# Patient Record
Sex: FEMALE | Race: WHITE | Employment: UNEMPLOYED | URBAN - METROPOLITAN AREA
[De-identification: names, ages, dates, MRNs, and addresses within clinical notes are randomized per-mention and may not be internally consistent; named-entity substitution may affect disease eponyms.]

---

## 2017-12-15 ENCOUNTER — TELEPHONE (OUTPATIENT)
Dept: BARIATRICS/WEIGHT MGMT | Age: 37
End: 2017-12-15

## 2019-12-19 ENCOUNTER — TELEPHONE (OUTPATIENT)
Dept: BARIATRICS/WEIGHT MGMT | Age: 39
End: 2019-12-19

## 2020-06-04 ENCOUNTER — TELEPHONE (OUTPATIENT)
Dept: BARIATRICS/WEIGHT MGMT | Age: 40
End: 2020-06-04

## 2020-06-12 ENCOUNTER — HOSPITAL ENCOUNTER (OUTPATIENT)
Age: 40
Discharge: HOME | End: 2020-06-12
Payer: COMMERCIAL

## 2020-06-12 LAB
ALBUMIN SERPL-MCNC: 4.2 G/DL (ref 3.5–5.2)
ALBUMIN/GLOBULIN RATIO: 1.5 (ref 1–2.5)
ALP BLD-CCNC: 62 U/L (ref 35–104)
ALT SERPL-CCNC: 11 U/L (ref 5–33)
ANION GAP SERPL CALCULATED.3IONS-SCNC: 15 MMOL/L (ref 9–17)
AST SERPL-CCNC: 14 U/L
BILIRUB SERPL-MCNC: 0.62 MG/DL (ref 0.3–1.2)
BUN BLDV-MCNC: 12 MG/DL (ref 6–20)
BUN/CREAT BLD: NORMAL (ref 9–20)
CALCIUM IONIZED: 1.31 MMOL/L (ref 1.13–1.33)
CALCIUM SERPL-MCNC: 9 MG/DL (ref 8.6–10.4)
CHLORIDE BLD-SCNC: 104 MMOL/L (ref 98–107)
CHOLESTEROL, FASTING: 185 MG/DL
CHOLESTEROL/HDL RATIO: 3.1
CO2: 22 MMOL/L (ref 20–31)
CREAT SERPL-MCNC: 0.65 MG/DL (ref 0.5–0.9)
FERRITIN: 8 UG/L (ref 13–150)
GFR AFRICAN AMERICAN: >60 ML/MIN
GFR NON-AFRICAN AMERICAN: >60 ML/MIN
GFR SERPL CREATININE-BSD FRML MDRD: NORMAL ML/MIN/{1.73_M2}
GFR SERPL CREATININE-BSD FRML MDRD: NORMAL ML/MIN/{1.73_M2}
GLUCOSE BLD-MCNC: 83 MG/DL (ref 70–99)
HCT VFR BLD CALC: 40.9 % (ref 36.3–47.1)
HDLC SERPL-MCNC: 60 MG/DL
HEMOGLOBIN: 12.7 G/DL (ref 11.9–15.1)
IRON SATURATION: 10 % (ref 20–55)
IRON: 40 UG/DL (ref 37–145)
LDL CHOLESTEROL: 108 MG/DL (ref 0–130)
MAGNESIUM: 1.8 MG/DL (ref 1.6–2.6)
MCH RBC QN AUTO: 24.1 PG (ref 25.2–33.5)
MCHC RBC AUTO-ENTMCNC: 31.1 G/DL (ref 28.4–34.8)
MCV RBC AUTO: 77.8 FL (ref 82.6–102.9)
NRBC AUTOMATED: 0 PER 100 WBC
PDW BLD-RTO: 14.2 % (ref 11.8–14.4)
PLATELET # BLD: 278 K/UL (ref 138–453)
PMV BLD AUTO: 12.8 FL (ref 8.1–13.5)
POTASSIUM SERPL-SCNC: 4.1 MMOL/L (ref 3.7–5.3)
PTH INTACT: 44.37 PG/ML (ref 15–65)
RBC # BLD: 5.26 M/UL (ref 3.95–5.11)
SODIUM BLD-SCNC: 141 MMOL/L (ref 135–144)
TOTAL IRON BINDING CAPACITY: 420 UG/DL (ref 250–450)
TOTAL PROTEIN: 7 G/DL (ref 6.4–8.3)
TRIGLYCERIDE, FASTING: 86 MG/DL
TSH SERPL DL<=0.05 MIU/L-ACNC: 1.5 MIU/L (ref 0.3–5)
UNSATURATED IRON BINDING CAPACITY: 380 UG/DL (ref 112–347)
VITAMIN B-12: 550 PG/ML (ref 232–1245)
VITAMIN D 25-HYDROXY: 39.7 NG/ML (ref 30–100)
VLDLC SERPL CALC-MCNC: NORMAL MG/DL (ref 1–30)
WBC # BLD: 6.4 K/UL (ref 3.5–11.3)

## 2020-06-12 PROCEDURE — 84590 ASSAY OF VITAMIN A: CPT

## 2020-06-12 PROCEDURE — 82330 ASSAY OF CALCIUM: CPT

## 2020-06-12 PROCEDURE — 83550 IRON BINDING TEST: CPT

## 2020-06-12 PROCEDURE — 83540 ASSAY OF IRON: CPT

## 2020-06-12 PROCEDURE — 82607 VITAMIN B-12: CPT

## 2020-06-12 PROCEDURE — 83735 ASSAY OF MAGNESIUM: CPT

## 2020-06-12 PROCEDURE — 84425 ASSAY OF VITAMIN B-1: CPT

## 2020-06-12 PROCEDURE — 84630 ASSAY OF ZINC: CPT

## 2020-06-12 PROCEDURE — 82728 ASSAY OF FERRITIN: CPT

## 2020-06-12 PROCEDURE — 80061 LIPID PANEL: CPT

## 2020-06-12 PROCEDURE — 84443 ASSAY THYROID STIM HORMONE: CPT

## 2020-06-12 PROCEDURE — 83970 ASSAY OF PARATHORMONE: CPT

## 2020-06-12 PROCEDURE — 36415 COLL VENOUS BLD VENIPUNCTURE: CPT

## 2020-06-12 PROCEDURE — 85027 COMPLETE CBC AUTOMATED: CPT

## 2020-06-12 PROCEDURE — 80053 COMPREHEN METABOLIC PANEL: CPT

## 2020-06-12 PROCEDURE — 82306 VITAMIN D 25 HYDROXY: CPT

## 2020-06-14 LAB
RETINYL PALMITATE: <0.02 MG/L (ref 0–0.1)
VITAMIN A LEVEL: 0.47 MG/L (ref 0.3–1.2)
VITAMIN A, INTERP: NORMAL
ZINC: 84.4 UG/DL (ref 60–120)

## 2020-06-16 LAB — VITAMIN B1 WHOLE BLOOD: 97 NMOL/L (ref 70–180)

## 2020-06-24 ENCOUNTER — OFFICE VISIT (OUTPATIENT)
Dept: BARIATRICS/WEIGHT MGMT | Age: 40
End: 2020-06-24
Payer: COMMERCIAL

## 2020-06-24 VITALS
TEMPERATURE: 97.6 F | HEIGHT: 67 IN | WEIGHT: 201 LBS | DIASTOLIC BLOOD PRESSURE: 68 MMHG | SYSTOLIC BLOOD PRESSURE: 104 MMHG | BODY MASS INDEX: 31.55 KG/M2 | HEART RATE: 68 BPM | RESPIRATION RATE: 20 BRPM

## 2020-06-24 PROBLEM — E66.9 OBESITY (BMI 30-39.9): Status: ACTIVE | Noted: 2020-06-24

## 2020-06-24 PROCEDURE — 99213 OFFICE O/P EST LOW 20 MIN: CPT | Performed by: NURSE PRACTITIONER

## 2020-06-24 RX ORDER — CALCIUM CARB/VITAMIN D3/VIT K1 650MG-12.5
TABLET,CHEWABLE ORAL
COMMUNITY
Start: 2016-06-01

## 2020-06-24 RX ORDER — NEOMYCIN/POLYMYXIN B/PRAMOXINE 3.5-10K-1
CREAM (GRAM) TOPICAL
COMMUNITY

## 2020-06-24 NOTE — PROGRESS NOTES
104/68 (Site: Right Upper Arm, Position: Sitting, Cuff Size: Large Adult)   Pulse 68   Temp 97.6 °F (36.4 °C) (Infrared)   Resp 20   Ht 5' 7\" (1.702 m)   Wt 201 lb (91.2 kg)   BMI 31.48 kg/m²     BMI/Height/Weight:  Body mass index is 31.48 kg/m². Review of Systems - A review of systems was performed. All was negative unless otherwise documented in HPI. Constitutional: Negative for fever, chills and diaphoresis. HENT: Negative for hearing loss and trouble swallowing. Eyes: Negative for photophobia and visual disturbance. Respiratory: Negative for cough, shortness of breath and wheezing. Cardiovascular: Negative for chest pain and palpitations. Gastrointestinal: Negative for nausea, vomiting, abdominal pain, diarrhea, constipation, blood in stool and abdominal distention. Endocrine: Negative for polydipsia, polyphagia and polyuria. Genitourinary: Negative for dysuria, frequency, hematuria and difficulty urinating. Musculoskeletal: Negative for myalgias, joint swelling. Skin: Negative for pallor and rash. Neurological: Negative for dizziness, tremors, light-headedness and headaches. Psychiatric/Behavioral: Negative for sleep disturbance and dysphoric mood. Objective:      Physical Exam   Vital signs reviewed. General: Well-developed and well-nourished. No acute distress. Skin: Warm, dry and intact. HEENT: Normocephalic. EOMs intact. Conjunctivae normal. Neck supple. Cardiovascular: Normal rate, regular rhythm. Pulmonary/Chest: Normal effort. Lungs clear to auscultation. No rales, rhonchi or wheezing. Abdominal: Positive bowel sounds. Soft, nontender. Nondistended. No rigidity, rebound, or guarding. Musculoskeletal: Movement x4. No edema. Neurological: Gait normal. Alert and oriented to person, place, and time. Psychiatric: Normal mood and affect. Speech and behavior normal. Judgment and thought content normal. Cognition and memory intact.      Assessment: Diagnosis Orders   1. Chronic migraine  Comprehensive Metabolic Panel    TSH without Reflex    T4, Free    Hemoglobin A1C    Vitamin B12 & Folate    Vitamin B1    Vitamin D 25 Hydroxy    Magnesium    Iron and TIBC    Vitamin A    Lipid Panel    PTH, Intact    CBC Auto Differential    Zinc    Ferritin   2. S/P gastric bypass  Comprehensive Metabolic Panel    TSH without Reflex    T4, Free    Hemoglobin A1C    Vitamin B12 & Folate    Vitamin B1    Vitamin D 25 Hydroxy    Magnesium    Iron and TIBC    Vitamin A    Lipid Panel    PTH, Intact    CBC Auto Differential    Zinc    Ferritin   3. Depression, unspecified depression type  Comprehensive Metabolic Panel    TSH without Reflex    T4, Free    Hemoglobin A1C    Vitamin B12 & Folate    Vitamin B1    Vitamin D 25 Hydroxy    Magnesium    Iron and TIBC    Vitamin A    Lipid Panel    PTH, Intact    CBC Auto Differential    Zinc    Ferritin   4. Obesity (BMI 30-39. 9)  Comprehensive Metabolic Panel    TSH without Reflex    T4, Free    Hemoglobin A1C    Vitamin B12 & Folate    Vitamin B1    Vitamin D 25 Hydroxy    Magnesium    Iron and TIBC    Vitamin A    Lipid Panel    PTH, Intact    CBC Auto Differential    Zinc    Ferritin       Plan:    Dietitian visit today. Patient to continue to increase protein to obtain 60-80g/day, at least 48-64oz of fluid daily, and gradually increase exercise regimen. Labs from 6/12/20 reviewed and discussed with patient. Iron low normal and ferritin low. She has switched to MVI with iron. Follow-up  Return in about 6 months (around 12/24/2020).     Orders this encounter:  Orders Placed This Encounter   Procedures    Comprehensive Metabolic Panel     Standing Status:   Future     Standing Expiration Date:   6/24/2021    TSH without Reflex     Standing Status:   Future     Standing Expiration Date:   6/24/2021    T4, Free     Standing Status:   Future     Standing Expiration Date:   6/24/2021    Hemoglobin A1C     Standing Status: